# Patient Record
Sex: MALE | Race: BLACK OR AFRICAN AMERICAN | NOT HISPANIC OR LATINO | Employment: FULL TIME | URBAN - METROPOLITAN AREA
[De-identification: names, ages, dates, MRNs, and addresses within clinical notes are randomized per-mention and may not be internally consistent; named-entity substitution may affect disease eponyms.]

---

## 2017-01-30 RX ORDER — LISINOPRIL 40 MG/1
TABLET ORAL
Qty: 30 TABLET | Refills: 0 | Status: SHIPPED | OUTPATIENT
Start: 2017-01-30 | End: 2017-03-03 | Stop reason: SDUPTHER

## 2017-02-04 ENCOUNTER — OFFICE VISIT (OUTPATIENT)
Dept: FAMILY MEDICINE CLINIC | Facility: CLINIC | Age: 62
End: 2017-02-04

## 2017-02-04 VITALS
WEIGHT: 206 LBS | HEIGHT: 68 IN | OXYGEN SATURATION: 95 % | HEART RATE: 70 BPM | DIASTOLIC BLOOD PRESSURE: 88 MMHG | TEMPERATURE: 98.4 F | SYSTOLIC BLOOD PRESSURE: 153 MMHG | BODY MASS INDEX: 31.22 KG/M2

## 2017-02-04 DIAGNOSIS — IMO0002 TENDINITIS OF ELBOW OR FOREARM: ICD-10-CM

## 2017-02-04 DIAGNOSIS — L98.9 SKIN LESION: ICD-10-CM

## 2017-02-04 DIAGNOSIS — Z12.5 SPECIAL SCREENING EXAMINATION FOR NEOPLASM OF PROSTATE: ICD-10-CM

## 2017-02-04 DIAGNOSIS — I10 ESSENTIAL HYPERTENSION: Primary | ICD-10-CM

## 2017-02-04 LAB
ALBUMIN SERPL-MCNC: 4.3 G/DL (ref 3.5–5.2)
ALBUMIN/GLOB SERPL: 1.3 G/DL
ALP SERPL-CCNC: 66 U/L (ref 39–117)
ALT SERPL W P-5'-P-CCNC: 32 U/L (ref 1–41)
ANION GAP SERPL CALCULATED.3IONS-SCNC: 15.3 MMOL/L
AST SERPL-CCNC: 23 U/L (ref 1–40)
BILIRUB SERPL-MCNC: 0.8 MG/DL (ref 0.1–1.2)
BUN BLD-MCNC: 15 MG/DL (ref 6–20)
BUN/CREAT SERPL: 12.8 (ref 7–25)
CALCIUM SPEC-SCNC: 9.5 MG/DL (ref 8.6–10.5)
CHLORIDE SERPL-SCNC: 101 MMOL/L (ref 98–107)
CHOLEST SERPL-MCNC: 190 MG/DL (ref 0–200)
CO2 SERPL-SCNC: 25.7 MMOL/L (ref 22–29)
CREAT BLD-MCNC: 1.17 MG/DL (ref 0.76–1.27)
DEVELOPER EXPIRATION DATE: NORMAL
DEVELOPER LOT NUMBER: NORMAL
ERYTHROCYTE [DISTWIDTH] IN BLOOD BY AUTOMATED COUNT: 13.1 % (ref 4.5–15)
EXPIRATION DATE: NORMAL
FECAL OCCULT BLOOD SCREEN, POC: NEGATIVE
GFR SERPL CREATININE-BSD FRML MDRD: 77 ML/MIN/1.73
GLOBULIN UR ELPH-MCNC: 3.4 GM/DL
GLUCOSE BLD-MCNC: 106 MG/DL (ref 65–99)
HCT VFR BLD AUTO: 46.4 % (ref 35–60)
HDLC SERPL-MCNC: 66 MG/DL (ref 40–60)
HGB BLD-MCNC: 14.6 G/DL (ref 13.5–18)
LDLC SERPL CALC-MCNC: 104 MG/DL (ref 0–100)
LDLC/HDLC SERPL: 1.57 {RATIO}
LYMPHOCYTES # BLD AUTO: 2 10*3/MM3 (ref 1.2–3.4)
LYMPHOCYTES NFR BLD AUTO: 28.6 % (ref 21–51)
Lab: NORMAL
MCH RBC QN AUTO: 28.4 PG (ref 26.1–33.1)
MCHC RBC AUTO-ENTMCNC: 31.4 G/DL (ref 33–37)
MCV RBC AUTO: 90.5 FL (ref 80–99)
MONOCYTES # BLD AUTO: 0.5 10*3/MM3 (ref 0.1–0.6)
MONOCYTES NFR BLD AUTO: 6.6 % (ref 2–9)
NEGATIVE CONTROL: NEGATIVE
NEUTROPHILS # BLD AUTO: 4.6 10*3/MM3 (ref 1.4–6.5)
NEUTROPHILS NFR BLD AUTO: 64.8 % (ref 42–75)
PLATELET # BLD AUTO: 245 10*3/MM3 (ref 150–450)
PMV BLD AUTO: 6.8 FL (ref 7.1–10.5)
POSITIVE CONTROL: POSITIVE
POTASSIUM BLD-SCNC: 4.5 MMOL/L (ref 3.5–5.2)
PROT SERPL-MCNC: 7.7 G/DL (ref 6–8.5)
PSA SERPL-MCNC: 1.8 NG/ML (ref 0–4)
RBC # BLD AUTO: 5.13 10*6/MM3 (ref 4–6)
SODIUM BLD-SCNC: 142 MMOL/L (ref 136–145)
T-UPTAKE NFR SERPL: 1.1 TBI (ref 0.8–1.3)
T4 SERPL-MCNC: 6.57 MCG/DL (ref 4.5–11.7)
TRIGL SERPL-MCNC: 102 MG/DL (ref 0–150)
TSH SERPL DL<=0.05 MIU/L-ACNC: 0.62 MIU/ML (ref 0.27–4.2)
VLDLC SERPL-MCNC: 20.4 MG/DL (ref 5–40)
WBC NRBC COR # BLD: 7.1 10*3/MM3 (ref 4.5–10)

## 2017-02-04 PROCEDURE — 85025 COMPLETE CBC W/AUTO DIFF WBC: CPT | Performed by: FAMILY MEDICINE

## 2017-02-04 PROCEDURE — 84479 ASSAY OF THYROID (T3 OR T4): CPT | Performed by: FAMILY MEDICINE

## 2017-02-04 PROCEDURE — 80061 LIPID PANEL: CPT | Performed by: FAMILY MEDICINE

## 2017-02-04 PROCEDURE — 82270 OCCULT BLOOD FECES: CPT | Performed by: FAMILY MEDICINE

## 2017-02-04 PROCEDURE — 99214 OFFICE O/P EST MOD 30 MIN: CPT | Performed by: FAMILY MEDICINE

## 2017-02-04 PROCEDURE — 84443 ASSAY THYROID STIM HORMONE: CPT | Performed by: FAMILY MEDICINE

## 2017-02-04 PROCEDURE — 84153 ASSAY OF PSA TOTAL: CPT | Performed by: FAMILY MEDICINE

## 2017-02-04 PROCEDURE — 84436 ASSAY OF TOTAL THYROXINE: CPT | Performed by: FAMILY MEDICINE

## 2017-02-04 PROCEDURE — 80053 COMPREHEN METABOLIC PANEL: CPT | Performed by: FAMILY MEDICINE

## 2017-02-04 RX ORDER — MELOXICAM 15 MG/1
15 TABLET ORAL DAILY
Qty: 30 TABLET | Refills: 1 | Status: SHIPPED | OUTPATIENT
Start: 2017-02-04 | End: 2017-05-15 | Stop reason: SDUPTHER

## 2017-02-04 NOTE — PROGRESS NOTES
SUBJECTIVE:  The patient is a 61-year-old -American male who is here for several issues.  He has discomfort in his left forearm.  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  He has a skin lesion on his neck that he wants to BE addressed.  He is due a prostate exam.  He has a history of hypertension and ED     PAST MEDICAL HISTORY:  Reviewed.    REVIEW OF SYSTEMS:  Please see above; 14 point ROS otherwise negative.      OBJECTIVE: Vitals signs are reviewed and are stable.    HEENT: PERRLA.    Neck:  Supple.   Raised lesion approximately 5 mm is present on the right lateral neck.  Lungs:  Clear.    Heart:  Regular rate and rhythm.    Abdomen:   Soft, nontender.    Extremities:  No cyanosis, clubbing or edema.   This is present over the dorsal proximal mid and distal forearm.  Pain is present with supination and pronation.  Vascular status is intact  Rectal: Prostate 2+ nontender no masses Hemoccult negative    ASSESSMENT:     Hypertension  Tendinitis left forearm  ED  Skin lesion of neck  Prostate exam      PLAN:   Under sterile prep the lesion was removed with scalpel blade.  Samples of Viagra are given with instructions.  CMP PSA fasting lipid TSH thyroid profile are ordered.  He will monitor his blood pressure.  The patient will follow up on these labs.  He is prescribed Mobic 15 mg daily with food for his tendinitis.  He will up he know if no improvement.    Much of this encounter note is an electronic transcription/translation of spoken language to printed text.  The electronic translation of spoken language may permit erroneous, or at times, nonsensical words or phrases to be inadvertently transcribed.  Although I have reviewed the note for such errors, some may still exist.

## 2017-03-03 RX ORDER — LISINOPRIL 40 MG/1
TABLET ORAL
Qty: 30 TABLET | Refills: 0 | Status: SHIPPED | OUTPATIENT
Start: 2017-03-03 | End: 2017-04-01 | Stop reason: SDUPTHER

## 2017-04-03 RX ORDER — LISINOPRIL 40 MG/1
TABLET ORAL
Qty: 30 TABLET | Refills: 0 | Status: SHIPPED | OUTPATIENT
Start: 2017-04-03 | End: 2017-06-16 | Stop reason: SDUPTHER

## 2017-05-15 DIAGNOSIS — I10 ESSENTIAL HYPERTENSION: ICD-10-CM

## 2017-05-15 DIAGNOSIS — L98.9 SKIN LESION: ICD-10-CM

## 2017-05-15 DIAGNOSIS — IMO0002 TENDINITIS OF ELBOW OR FOREARM: ICD-10-CM

## 2017-05-15 DIAGNOSIS — Z12.5 SPECIAL SCREENING EXAMINATION FOR NEOPLASM OF PROSTATE: ICD-10-CM

## 2017-05-15 RX ORDER — MELOXICAM 15 MG/1
TABLET ORAL
Qty: 30 TABLET | Refills: 0 | Status: SHIPPED | OUTPATIENT
Start: 2017-05-15 | End: 2017-06-15 | Stop reason: SDUPTHER

## 2017-06-15 DIAGNOSIS — IMO0002 TENDINITIS OF ELBOW OR FOREARM: ICD-10-CM

## 2017-06-15 DIAGNOSIS — L98.9 SKIN LESION: ICD-10-CM

## 2017-06-15 DIAGNOSIS — I10 ESSENTIAL HYPERTENSION: ICD-10-CM

## 2017-06-15 DIAGNOSIS — Z12.5 SPECIAL SCREENING EXAMINATION FOR NEOPLASM OF PROSTATE: ICD-10-CM

## 2017-06-15 RX ORDER — MELOXICAM 15 MG/1
TABLET ORAL
Qty: 30 TABLET | Refills: 0 | Status: SHIPPED | OUTPATIENT
Start: 2017-06-15 | End: 2017-07-10

## 2017-06-17 RX ORDER — LISINOPRIL 40 MG/1
TABLET ORAL
Qty: 30 TABLET | Refills: 0 | Status: SHIPPED | OUTPATIENT
Start: 2017-06-17 | End: 2018-10-04

## 2017-07-10 ENCOUNTER — OFFICE VISIT (OUTPATIENT)
Dept: FAMILY MEDICINE CLINIC | Facility: CLINIC | Age: 62
End: 2017-07-10

## 2017-07-10 VITALS
SYSTOLIC BLOOD PRESSURE: 166 MMHG | HEIGHT: 68 IN | TEMPERATURE: 98.3 F | DIASTOLIC BLOOD PRESSURE: 92 MMHG | BODY MASS INDEX: 32.34 KG/M2 | WEIGHT: 213.4 LBS | OXYGEN SATURATION: 96 % | HEART RATE: 73 BPM

## 2017-07-10 DIAGNOSIS — R73.9 HYPERGLYCEMIA: ICD-10-CM

## 2017-07-10 DIAGNOSIS — I10 ESSENTIAL (PRIMARY) HYPERTENSION: Primary | ICD-10-CM

## 2017-07-10 DIAGNOSIS — R05.8 COUGH DUE TO ACE INHIBITOR: ICD-10-CM

## 2017-07-10 DIAGNOSIS — T46.4X5A COUGH DUE TO ACE INHIBITOR: ICD-10-CM

## 2017-07-10 DIAGNOSIS — N40.2 PROSTATE NODULE WITHOUT URINARY OBSTRUCTION: ICD-10-CM

## 2017-07-10 DIAGNOSIS — M54.2 NECK PAIN: ICD-10-CM

## 2017-07-10 DIAGNOSIS — R39.89 ABNORMAL PROSTATE EXAM: ICD-10-CM

## 2017-07-10 LAB
ALBUMIN SERPL-MCNC: 4.4 G/DL (ref 3.5–5.2)
ALBUMIN/GLOB SERPL: 1.4 G/DL
ALP SERPL-CCNC: 69 U/L (ref 39–117)
ALT SERPL W P-5'-P-CCNC: 27 U/L (ref 1–41)
ANION GAP SERPL CALCULATED.3IONS-SCNC: 14.7 MMOL/L
AST SERPL-CCNC: 22 U/L (ref 1–40)
BILIRUB SERPL-MCNC: 0.8 MG/DL (ref 0.1–1.2)
BUN BLD-MCNC: 10 MG/DL (ref 8–23)
BUN/CREAT SERPL: 8.6 (ref 7–25)
CALCIUM SPEC-SCNC: 8.8 MG/DL (ref 8.6–10.5)
CHLORIDE SERPL-SCNC: 100 MMOL/L (ref 98–107)
CHOLEST SERPL-MCNC: 172 MG/DL (ref 0–200)
CO2 SERPL-SCNC: 23.3 MMOL/L (ref 22–29)
CREAT BLD-MCNC: 1.16 MG/DL (ref 0.76–1.27)
GFR SERPL CREATININE-BSD FRML MDRD: 78 ML/MIN/1.73
GLOBULIN UR ELPH-MCNC: 3.2 GM/DL
GLUCOSE BLD-MCNC: 110 MG/DL (ref 65–99)
HBA1C MFR BLD: 5.6 % (ref 4.8–5.6)
HDLC SERPL-MCNC: 57 MG/DL (ref 40–60)
LDLC SERPL CALC-MCNC: 96 MG/DL (ref 0–100)
LDLC/HDLC SERPL: 1.69 {RATIO}
POTASSIUM BLD-SCNC: 4.2 MMOL/L (ref 3.5–5.2)
PROT SERPL-MCNC: 7.6 G/DL (ref 6–8.5)
PSA SERPL-MCNC: 2.06 NG/ML (ref 0–4)
SODIUM BLD-SCNC: 138 MMOL/L (ref 136–145)
TRIGL SERPL-MCNC: 94 MG/DL (ref 0–150)
VLDLC SERPL-MCNC: 18.8 MG/DL (ref 5–40)

## 2017-07-10 PROCEDURE — 83036 HEMOGLOBIN GLYCOSYLATED A1C: CPT | Performed by: FAMILY MEDICINE

## 2017-07-10 PROCEDURE — 80053 COMPREHEN METABOLIC PANEL: CPT | Performed by: FAMILY MEDICINE

## 2017-07-10 PROCEDURE — 99214 OFFICE O/P EST MOD 30 MIN: CPT | Performed by: FAMILY MEDICINE

## 2017-07-10 PROCEDURE — 84153 ASSAY OF PSA TOTAL: CPT | Performed by: FAMILY MEDICINE

## 2017-07-10 PROCEDURE — 80061 LIPID PANEL: CPT | Performed by: FAMILY MEDICINE

## 2017-07-10 RX ORDER — IBUPROFEN 800 MG/1
TABLET ORAL
Qty: 60 TABLET | Refills: 3 | Status: SHIPPED | OUTPATIENT
Start: 2017-07-10 | End: 2018-07-17 | Stop reason: SDUPTHER

## 2017-07-10 RX ORDER — LOSARTAN POTASSIUM 100 MG/1
100 TABLET ORAL DAILY
Qty: 90 TABLET | Refills: 3 | Status: SHIPPED | OUTPATIENT
Start: 2017-07-10 | End: 2018-07-21 | Stop reason: SDUPTHER

## 2017-07-10 NOTE — PROGRESS NOTES
SUBJECTIVE:  The patient is a 62-year-old -American male is here for several issues.  He is due labs.  He has a history of hypertension.  He has ongoing neck pain.  He feels that his cough that he is experiencing may be caused by lisinopril.  He wants to change.  He has a history of ED.  He has a history of hyperglycemia.     PAST MEDICAL HISTORY:  Reviewed.    REVIEW OF SYSTEMS:  Please see above; 14 point ROS otherwise negative.      OBJECTIVE: Vitals signs are reviewed and are stable.    HEENT: PERRLA.    Neck:  Supple.    Lungs:  Clear.    Heart:  Regular rate and rhythm.    Abdomen:   Soft, nontender.    Extremities:  No cyanosis, clubbing or edema.      ASSESSMENT:    Hypertension  Abnormal prostate exam  Neck pain  Hyperglycemia  Cough-probable Ace    PLAN:  Discontinue lisinopril.  Change to losartan 100 mg.  Take daily.  Monitor blood pressure.  Refill Flexeril.  Discontinue Lopid.  Change ibuprofen 800 mg every 8 hours with food.  Refer to urologist.  He is up-to-date on his colonoscopy.  EMP fasting lipids hemoglobin A1c are ordered.    Much of this encounter note is an electronic transcription/translation of spoken language to printed text.  The electronic translation of spoken language may permit erroneous, or at times, nonsensical words or phrases to be inadvertently transcribed.  Although I have reviewed the note for such errors, some may still exist.

## 2017-07-13 ENCOUNTER — OFFICE VISIT (OUTPATIENT)
Dept: FAMILY MEDICINE CLINIC | Facility: CLINIC | Age: 62
End: 2017-07-13

## 2017-07-13 ENCOUNTER — TELEPHONE (OUTPATIENT)
Dept: FAMILY MEDICINE CLINIC | Facility: CLINIC | Age: 62
End: 2017-07-13

## 2017-07-13 VITALS
BODY MASS INDEX: 32.52 KG/M2 | OXYGEN SATURATION: 95 % | HEIGHT: 68 IN | WEIGHT: 214.6 LBS | TEMPERATURE: 98.9 F | DIASTOLIC BLOOD PRESSURE: 98 MMHG | HEART RATE: 78 BPM | SYSTOLIC BLOOD PRESSURE: 160 MMHG

## 2017-07-13 DIAGNOSIS — J01.90 ACUTE SINUSITIS, RECURRENCE NOT SPECIFIED, UNSPECIFIED LOCATION: Primary | ICD-10-CM

## 2017-07-13 PROCEDURE — 99213 OFFICE O/P EST LOW 20 MIN: CPT | Performed by: FAMILY MEDICINE

## 2017-07-13 RX ORDER — LEVOFLOXACIN 500 MG/1
500 TABLET, FILM COATED ORAL DAILY
Qty: 10 TABLET | Refills: 0 | Status: SHIPPED | OUTPATIENT
Start: 2017-07-13 | End: 2018-10-04

## 2017-07-13 NOTE — PROGRESS NOTES
SUBJECTIVE:  The patient is a 62-year-old Afro-American male who comes in with three-day history of facial pressure congestion right ear discomfort and he took an Allegra without much improvement.  He is unaware of any fever or chills.  He's had a slight cough.     PAST MEDICAL HISTORY:  Reviewed.    REVIEW OF SYSTEMS:  Please see above; 14 point ROS otherwise negative.      OBJECTIVE: Vitals signs are reviewed and are stable.    HEENT: PERRLA.  Right tympanic membrane is red.  Left looks okay.  Throat red.  Neck:  Supple.    Lungs:  Clear.    Heart:  Regular rate and rhythm.    Abdomen:   Soft, nontender.    Extremities:  No cyanosis, clubbing or edema.      ASSESSMENT:    Acute sinusitis    PLAN:  Levaquin 500 milligrams daily.  Flonase as directed.  Follow-up in a couple days if no better.  Notify sooner if problems.    Much of this encounter note is an electronic transcription/translation of spoken language to printed text.  The electronic translation of spoken language may permit erroneous, or at times, nonsensical words or phrases to be inadvertently transcribed.  Although I have reviewed the note for such errors, some may still exist.

## 2017-07-14 DIAGNOSIS — IMO0002 TENDINITIS OF ELBOW OR FOREARM: ICD-10-CM

## 2017-07-14 DIAGNOSIS — I10 ESSENTIAL HYPERTENSION: ICD-10-CM

## 2017-07-14 DIAGNOSIS — L98.9 SKIN LESION: ICD-10-CM

## 2017-07-14 DIAGNOSIS — Z12.5 SPECIAL SCREENING EXAMINATION FOR NEOPLASM OF PROSTATE: ICD-10-CM

## 2017-07-14 RX ORDER — MELOXICAM 15 MG/1
TABLET ORAL
Qty: 30 TABLET | Refills: 0 | Status: SHIPPED | OUTPATIENT
Start: 2017-07-14 | End: 2018-10-04

## 2017-11-06 RX ORDER — FLUTICASONE PROPIONATE 50 MCG
SPRAY, SUSPENSION (ML) NASAL
Qty: 16 ML | Refills: 0 | Status: SHIPPED | OUTPATIENT
Start: 2017-11-06 | End: 2017-11-30 | Stop reason: SDUPTHER

## 2017-11-30 RX ORDER — FLUTICASONE PROPIONATE 50 MCG
SPRAY, SUSPENSION (ML) NASAL
Qty: 16 ML | Refills: 0 | Status: SHIPPED | OUTPATIENT
Start: 2017-11-30 | End: 2017-12-29 | Stop reason: SDUPTHER

## 2017-12-06 ENCOUNTER — TRANSCRIBE ORDERS (OUTPATIENT)
Dept: PHYSICAL THERAPY | Facility: CLINIC | Age: 62
End: 2017-12-06

## 2017-12-06 DIAGNOSIS — M25.511 RIGHT SHOULDER PAIN, UNSPECIFIED CHRONICITY: ICD-10-CM

## 2017-12-06 DIAGNOSIS — M54.2 PAIN, NECK: ICD-10-CM
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
## 2017-12-07 ENCOUNTER — TREATMENT (OUTPATIENT)
Dept: PHYSICAL THERAPY | Facility: CLINIC | Age: 62
End: 2017-12-07

## 2017-12-07 DIAGNOSIS — M54.2 PAIN, NECK: Primary | ICD-10-CM

## 2017-12-07 PROCEDURE — 97140 MANUAL THERAPY 1/> REGIONS: CPT | Performed by: PHYSICAL THERAPIST

## 2017-12-07 PROCEDURE — 97035 APP MDLTY 1+ULTRASOUND EA 15: CPT | Performed by: PHYSICAL THERAPIST

## 2017-12-07 PROCEDURE — 97001 PR PHYS THERAPY EVALUATION: CPT | Performed by: PHYSICAL THERAPIST

## 2017-12-07 PROCEDURE — 97110 THERAPEUTIC EXERCISES: CPT | Performed by: PHYSICAL THERAPIST
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
## 2017-12-13 ENCOUNTER — TREATMENT (OUTPATIENT)
Dept: PHYSICAL THERAPY | Facility: CLINIC | Age: 62
End: 2017-12-13

## 2017-12-13 DIAGNOSIS — M54.2 PAIN, NECK: Primary | ICD-10-CM

## 2017-12-13 PROCEDURE — 97140 MANUAL THERAPY 1/> REGIONS: CPT | Performed by: PHYSICAL THERAPIST

## 2017-12-13 PROCEDURE — 97035 APP MDLTY 1+ULTRASOUND EA 15: CPT | Performed by: PHYSICAL THERAPIST

## 2017-12-13 PROCEDURE — 97110 THERAPEUTIC EXERCISES: CPT | Performed by: PHYSICAL THERAPIST

## 2017-12-13 NOTE — PROGRESS NOTES
Physical Therapy Daily Progress Note            Subjective   Stiff but a little better    Objective   See Exercise, Manual, and Modality Logs for complete treatment.       Assessment/Plan    Responding favorably to Rx with some dec in pain but still with moderate mm guarding and tenderness    Progress as tanya           Manual Therapy:    16     mins  01546;  Therapeutic Exercise:    24     mins  45996;     Neuromuscular Je:    0    mins  40879;    Therapeutic Activity:     0     mins  01361;     Gait Trainin     mins  02903;     Ultrasound:     8     mins  92757;    Work Hardening           0      mins 09923  Iontophoresis               0   mins 24803    Timed Treatment:   48   mins   Total Treatment:     50   mins    Jacqui Hernandez PT  Physical Therapist

## 2017-12-14 ENCOUNTER — TREATMENT (OUTPATIENT)
Dept: PHYSICAL THERAPY | Facility: CLINIC | Age: 62
End: 2017-12-14

## 2017-12-14 DIAGNOSIS — M54.2 PAIN, NECK: Primary | ICD-10-CM

## 2017-12-14 PROCEDURE — 97035 APP MDLTY 1+ULTRASOUND EA 15: CPT | Performed by: PHYSICAL THERAPIST

## 2017-12-14 PROCEDURE — 97110 THERAPEUTIC EXERCISES: CPT | Performed by: PHYSICAL THERAPIST

## 2017-12-14 PROCEDURE — 97140 MANUAL THERAPY 1/> REGIONS: CPT | Performed by: PHYSICAL THERAPIST

## 2017-12-14 NOTE — PROGRESS NOTES
Physical Therapy Daily Progress Note            Subjective   Feeling better    Objective   See Exercise, Manual, and Modality Logs for complete treatment.       Assessment/Plan    Continues with moderate guarding and tenderness but responds well to manual Rx with dec pain at end of sessions    Assess for MD next session             Manual Therapy:    14     mins  89181;  Therapeutic Exercise:    26     mins  26386;     Neuromuscular Je:    0    mins  12229;    Therapeutic Activity:     0     mins  98507;     Gait Trainin     mins  98553;     Ultrasound:     8     mins  96553;    Work Hardening           0      mins 29745  Iontophoresis               0   mins 10219    Timed Treatment:   48   mins   Total Treatment:     50   mins    Jacqui Hernandez PT  Physical Therapist

## 2017-12-18 ENCOUNTER — TREATMENT (OUTPATIENT)
Dept: PHYSICAL THERAPY | Facility: CLINIC | Age: 62
End: 2017-12-18

## 2017-12-18 DIAGNOSIS — M54.2 PAIN, NECK: Primary | ICD-10-CM

## 2017-12-18 PROCEDURE — 97530 THERAPEUTIC ACTIVITIES: CPT | Performed by: PHYSICAL THERAPIST

## 2017-12-18 PROCEDURE — 97140 MANUAL THERAPY 1/> REGIONS: CPT | Performed by: PHYSICAL THERAPIST

## 2017-12-18 PROCEDURE — 97110 THERAPEUTIC EXERCISES: CPT | Performed by: PHYSICAL THERAPIST

## 2017-12-18 NOTE — PROGRESS NOTES
Physical Therapy  Progress Note    Time In 10:34  Time Out 11:37      12/18/2017  DANNI Regalado    Re: Stewart Hyde  ________________________________________________________________    Mr. Stewart Hyde, has attended 4/4 PT sessions.  Treatment has consisted of: manual, there-ex, HEP, modalities, pt ed     S: Mr. Stewart Hyde states: he's feeling a lot better          Subjective     Objective       Postural Observations    Additional Postural Observation Details  Forward head posture with right shoulder rounded and internally rotated    Palpation   Left   Tenderness of the suboccipitals.     Right Tenderness of the levator scapulae, middle trapezius, scalenes, sternocleidomastoid, suboccipitals and upper trapezius.     Additional Palpation Details  All better though    Tenderness   Cervical Spine   Tenderness in the right scapula.     Active Range of Motion     Additional Active Range of Motion Details  All WFL withsome mild pain on R side with LROT and SB    Strength/Myotome Testing   Cervical Spine   Neck extension: 4+  Neck flexion: 4    Left   Neck lateral flexion (C3): 4+    Right   Neck lateral flexion (C3): 4    Left Shoulder     Planes of Motion   Flexion: 5   Abduction: 5     Right Shoulder     Planes of Motion   Flexion: 4+ (pain)   Abduction: 4+ (pain)     Tests   Cervical     Left   Negative Spurling's sign.     Right   Negative Spurling's sign.     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       Assessment/Plan    Good response to PT with dec c/o pain and improved mobility and tenderness.  Still with tenderness, pain with some movements and limited tanya for lifting.  Could benefit from another week of  PT.    Please advise             Manual Therapy:    14     mins  80663;  Therapeutic Exercise:    29    mins  10628;     Neuromuscular Je:    0    mins  71863;    Therapeutic Activity:      8    mins  51108;     Gait Trainin     mins  26403;     Ultrasound:     8     mins  80299;    Work Hardening           0      mins 55676  Iontophoresis               0   mins 71755    Timed Treatment:   60  mins   Total Treatment:     63   mins    Jacqui Hernandez, PT  Physical Therapist

## 2017-12-29 RX ORDER — FLUTICASONE PROPIONATE 50 MCG
SPRAY, SUSPENSION (ML) NASAL
Qty: 16 ML | Refills: 0 | Status: SHIPPED | OUTPATIENT
Start: 2017-12-29 | End: 2018-01-27 | Stop reason: SDUPTHER

## 2018-01-29 RX ORDER — FLUTICASONE PROPIONATE 50 MCG
SPRAY, SUSPENSION (ML) NASAL
Qty: 16 ML | Refills: 0 | Status: SHIPPED | OUTPATIENT
Start: 2018-01-29

## 2018-07-17 RX ORDER — IBUPROFEN 800 MG/1
TABLET ORAL
Qty: 60 TABLET | Refills: 0 | Status: SHIPPED | OUTPATIENT
Start: 2018-07-17 | End: 2018-10-04 | Stop reason: SDUPTHER

## 2018-07-22 RX ORDER — LOSARTAN POTASSIUM 100 MG/1
100 TABLET ORAL DAILY
Qty: 90 TABLET | Refills: 0 | Status: SHIPPED | OUTPATIENT
Start: 2018-07-22 | End: 2018-10-11

## 2018-10-04 ENCOUNTER — OFFICE VISIT (OUTPATIENT)
Dept: FAMILY MEDICINE CLINIC | Facility: CLINIC | Age: 63
End: 2018-10-04

## 2018-10-04 VITALS
BODY MASS INDEX: 31.07 KG/M2 | HEIGHT: 68 IN | DIASTOLIC BLOOD PRESSURE: 90 MMHG | OXYGEN SATURATION: 95 % | TEMPERATURE: 98.8 F | SYSTOLIC BLOOD PRESSURE: 160 MMHG | HEART RATE: 73 BPM | WEIGHT: 205 LBS

## 2018-10-04 DIAGNOSIS — I10 ESSENTIAL (PRIMARY) HYPERTENSION: ICD-10-CM

## 2018-10-04 DIAGNOSIS — M79.601 RIGHT ARM PAIN: ICD-10-CM

## 2018-10-04 DIAGNOSIS — S16.1XXA STRAIN OF NECK MUSCLE, INITIAL ENCOUNTER: Primary | ICD-10-CM

## 2018-10-04 PROCEDURE — 99213 OFFICE O/P EST LOW 20 MIN: CPT | Performed by: NURSE PRACTITIONER

## 2018-10-04 RX ORDER — CYCLOBENZAPRINE HCL 10 MG
10 TABLET ORAL 3 TIMES DAILY PRN
Qty: 30 TABLET | Refills: 0 | Status: SHIPPED | OUTPATIENT
Start: 2018-10-04

## 2018-10-04 RX ORDER — IBUPROFEN 800 MG/1
800 TABLET ORAL EVERY 8 HOURS PRN
Qty: 60 TABLET | Refills: 0 | Status: SHIPPED | OUTPATIENT
Start: 2018-10-04

## 2018-10-04 NOTE — PATIENT INSTRUCTIONS
Flexeril 10mg every 8 hours as needed for spasm, educated about sedation.   Ibuprofen 800m,g every 8 hours as needed for pain,   Apply heat and ice alternating on 20 min off 1 hour   Deferred xrays today.   If symptoms persist call office.cont f/u as scheduled next week.   Increase fluid intake, get plenty of rest.   Patient agrees with plan of care and understands instructions. Call if worsening symptoms or any problems or concerns.
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
## 2018-10-09 NOTE — PROGRESS NOTES
SUBJECTIVE:   Stewart Hyde is a 63 y.o., male, who presents for a complete physical examination.    Occupation:  Reviewed  Family History:  Reviewed  Past Medical History: Reviewed  Surgical History:  Reviewed  Social History:  Reviewed    Review of Systems:  Constitutional:  Negative for chills, fever, dizziness, weight loss, diaphoresis,   Eyes:  Negative for blurred vision, redness, discharge, diplopia, photophobia or itching.  ENT:  Negative for earache, ST, toothache, rhinorrhea, hoarseness, or PND.  Musculoskeletal:  Negative for neck pain.  No heat, myalgia, redness or joint pain.  Cardiovascular:  He will become short of breath going up a flight of stairs.  chest pain, LE edema, palpitations, or rapid heart rate.    Skin:  Negative for bruising, swelling, rash, abrasions, or itching.    Respiratory:  Negatie for pleuritic chest pain, shortness of air, nonproductive/productive cough, hemoptysis, or GUERRERO.    Neurologic:  Negative for history of weakness, numbness, paresthesia, loss of consciousness, speech change, or ataxia.    Gastrointestinal:  Negative for melena, hematochezia, nausea, vomiting, change in bowel habits.    Genitourinary:  Negative for flank pain, dysuria, frequency, or hematuria.    Psychiatric:  Negative for agitation, suicidal ideation, change in mental status, depression, confusion, or insomnia.  Hematologic:  Negative for nodes, bruising, bleeding, or petechiae.  Immunologic:  Negative for atopic dermatitis, sneezing, rhinorrhea, or hives.  Integument: No suspicious lesions. Skin turgor good.    ALLERGIES:  Penicillin    OBJECTIVE:    Vital Signs:  Reviewed.  Initial blood pressure 140/106  Constitutional:  Alert and oriented x3, in no acute distress.  Eyes:  Sclerae white, conjunctivae clear.  Lids are without lag.  PERRLA. Discs sharp.    Ears:  No scars, lesions or masses.  Tympanic membranes translucent, nonbulging, and mobile. Canal walls are pink.  Septum is midline.    Mouth:   Lips are pink and symmetrical.  Gums are pink.  Good dentition.    Throat:  Oral mucosa pink and moist.  Salivery glands intact.  Soft and hard palates contiguous.  Tongue moist without ulcers.  Gag reflex present.    Neck:  Full range of motion.  Trachea midline position.  No thyromegaly.   Respiratory:  Respirations are even and unlabored.  Lung fields with no flatness, dullness, or hyperresonance.  Clear and equal breath sounds with no adventitious sounds bilaterally.    Cardiovascular:  No lifts, heaves, or thrills.  PMI present.  S1 and S2 not exaggerated or diminished.  Regular rate and rhythm, without murmurs, rubs or gallops.  Normal carotids.  Pedal pulses are within normal limits bilaterally.  No edema.  No varicosities.    Chest:  Equal bilateral expansion.  Abdomen:  No masses or tenderness.  Bowel sounds active x4 quadrants.  Liver and spleen are without tenderness or enlargement.  No hernias. No organomegaly.   Digital Rectal Exam: Patient declines.  Has appointment with urologist for prostate exam next week.  Genitalia:  Normal circumcised male.  Cystic lesion present on the right shaft of penis.  Lymphatic:  Areas palpated are not enlarged.    Extremities:  Full range of motion. No cyanosis, clubbing, or edema.  Musculoskeletal:  Gait coordinated and smooth.    Skin:  No rashes, lesions, or ulcers.  No discoloration.  Warm and dry.  Normal turgor.    Neurologic:  Cranial nerves are intact.  Deep tendon reflexes are 2+ bilaterally.  Superficial touch and pain sensation intact bilaterally.  Psychiatric:  Judgement and insight are normal.  Orientation to time, place and person.  Normal mood and affect.  Memory intact.      EKG is done here and interpreted by me.  EKG compared December 2013.  EKG shows sinus rhythm with findings consistent with left ventricular hypertrophy.  An inverted T-wave is present and V6 which was not present in 2013    Chest x-rays done here and interpreted by me.  Indication  complete physical.  None for comparison.  Chest x-ray shows no acute abnormalities.    ECG 12 Lead  Date/Time: 10/11/2018 9:51 AM  Performed by: VIOLETTE LEDESMA  Authorized by: VIOLETTE LEDESMA   Rhythm: sinus rhythm  Clinical impression: non-specific ECG            LABORATORY:  CBC CMP fasting lipids TSH thyroid profile urinalysis    ASSESSMENT:  Complete physical examination.      PL.AN:  Healthy lifestyle discussed.  Patient is going to call his gastroenterologist in arrange his colonoscopy which she thinks is new.  Patient's medication for blood pressure will be changed to Avapro 150 mg daily will monitor his blood pressure.  He is referred for a stress echo.

## 2018-10-11 ENCOUNTER — OFFICE VISIT (OUTPATIENT)
Dept: FAMILY MEDICINE CLINIC | Facility: CLINIC | Age: 63
End: 2018-10-11

## 2018-10-11 VITALS
TEMPERATURE: 98.3 F | OXYGEN SATURATION: 97 % | HEART RATE: 69 BPM | SYSTOLIC BLOOD PRESSURE: 140 MMHG | BODY MASS INDEX: 31.22 KG/M2 | HEIGHT: 68 IN | WEIGHT: 206 LBS | DIASTOLIC BLOOD PRESSURE: 106 MMHG

## 2018-10-11 DIAGNOSIS — Z00.00 ENCOUNTER FOR PREVENTATIVE ADULT HEALTH CARE EXAMINATION: Primary | ICD-10-CM

## 2018-10-11 DIAGNOSIS — R06.09 EXERTIONAL DYSPNEA: ICD-10-CM

## 2018-10-11 LAB
25(OH)D3 SERPL-MCNC: 31.8 NG/ML (ref 30–100)
ALBUMIN SERPL-MCNC: 4.6 G/DL (ref 3.5–5.2)
ALBUMIN/GLOB SERPL: 1.4 G/DL
ALP SERPL-CCNC: 67 U/L (ref 39–117)
ALT SERPL W P-5'-P-CCNC: 22 U/L (ref 1–41)
ANION GAP SERPL CALCULATED.3IONS-SCNC: 10.8 MMOL/L
AST SERPL-CCNC: 22 U/L (ref 1–40)
BACTERIA UR QL AUTO: NORMAL /HPF
BILIRUB SERPL-MCNC: 0.8 MG/DL (ref 0.1–1.2)
BILIRUB UR QL STRIP: NEGATIVE
BUN BLD-MCNC: 18 MG/DL (ref 8–23)
BUN/CREAT SERPL: 13.8 (ref 7–25)
CALCIUM SPEC-SCNC: 9.5 MG/DL (ref 8.6–10.5)
CHLORIDE SERPL-SCNC: 103 MMOL/L (ref 98–107)
CHOLEST SERPL-MCNC: 159 MG/DL (ref 0–200)
CLARITY UR: CLEAR
CO2 SERPL-SCNC: 27.2 MMOL/L (ref 22–29)
COLOR UR: YELLOW
CREAT BLD-MCNC: 1.3 MG/DL (ref 0.76–1.27)
ERYTHROCYTE [DISTWIDTH] IN BLOOD BY AUTOMATED COUNT: 13 % (ref 4.5–15)
GFR SERPL CREATININE-BSD FRML MDRD: 68 ML/MIN/1.73
GLOBULIN UR ELPH-MCNC: 3.3 GM/DL
GLUCOSE BLD-MCNC: 101 MG/DL (ref 65–99)
GLUCOSE UR STRIP-MCNC: NEGATIVE MG/DL
HCT VFR BLD AUTO: 45.6 % (ref 35–60)
HDLC SERPL-MCNC: 62 MG/DL (ref 40–60)
HGB BLD-MCNC: 14.9 G/DL (ref 13.5–18)
HGB UR QL STRIP.AUTO: NEGATIVE
KETONES UR QL STRIP: NEGATIVE
LDLC SERPL CALC-MCNC: 82 MG/DL (ref 0–100)
LDLC/HDLC SERPL: 1.32 {RATIO}
LEUKOCYTE ESTERASE UR QL STRIP.AUTO: NEGATIVE
LYMPHOCYTES # BLD AUTO: 2 10*3/MM3 (ref 1.2–3.4)
LYMPHOCYTES NFR BLD AUTO: 30.5 % (ref 21–51)
MCH RBC QN AUTO: 29.7 PG (ref 26.1–33.1)
MCHC RBC AUTO-ENTMCNC: 32.7 G/DL (ref 33–37)
MCV RBC AUTO: 90.9 FL (ref 80–99)
MONOCYTES # BLD AUTO: 0.6 10*3/MM3 (ref 0.1–0.6)
MONOCYTES NFR BLD AUTO: 8.6 % (ref 2–9)
NEUTROPHILS # BLD AUTO: 3.9 10*3/MM3 (ref 1.4–6.5)
NEUTROPHILS NFR BLD AUTO: 60.9 % (ref 42–75)
NITRITE UR QL STRIP: NEGATIVE
PH UR STRIP.AUTO: 6 [PH] (ref 4.6–8)
PLATELET # BLD AUTO: 249 10*3/MM3 (ref 150–450)
PMV BLD AUTO: 7.4 FL (ref 7.1–10.5)
POTASSIUM BLD-SCNC: 4.3 MMOL/L (ref 3.5–5.2)
PROT SERPL-MCNC: 7.9 G/DL (ref 6–8.5)
PROT UR QL STRIP: NEGATIVE
RBC # BLD AUTO: 5.02 10*6/MM3 (ref 4–6)
RBC # UR: NORMAL /HPF
REF LAB TEST METHOD: NORMAL
SODIUM BLD-SCNC: 141 MMOL/L (ref 136–145)
SP GR UR STRIP: 1.01 (ref 1–1.03)
SQUAMOUS #/AREA URNS HPF: NORMAL /HPF
T-UPTAKE NFR SERPL: 1.1 TBI (ref 0.8–1.3)
T4 SERPL-MCNC: 7.33 MCG/DL (ref 4.5–11.7)
TRIGL SERPL-MCNC: 75 MG/DL (ref 0–150)
TSH SERPL DL<=0.05 MIU/L-ACNC: 0.8 MIU/ML (ref 0.27–4.2)
UROBILINOGEN UR QL STRIP: NORMAL
VLDLC SERPL-MCNC: 15 MG/DL (ref 5–40)
WBC NRBC COR # BLD: 6.4 10*3/MM3 (ref 4.5–10)
WBC UR QL AUTO: NORMAL /HPF

## 2018-10-11 PROCEDURE — 84479 ASSAY OF THYROID (T3 OR T4): CPT | Performed by: FAMILY MEDICINE

## 2018-10-11 PROCEDURE — 82306 VITAMIN D 25 HYDROXY: CPT | Performed by: FAMILY MEDICINE

## 2018-10-11 PROCEDURE — 84436 ASSAY OF TOTAL THYROXINE: CPT | Performed by: FAMILY MEDICINE

## 2018-10-11 PROCEDURE — 84443 ASSAY THYROID STIM HORMONE: CPT | Performed by: FAMILY MEDICINE

## 2018-10-11 PROCEDURE — 85025 COMPLETE CBC W/AUTO DIFF WBC: CPT | Performed by: FAMILY MEDICINE

## 2018-10-11 PROCEDURE — 71046 X-RAY EXAM CHEST 2 VIEWS: CPT | Performed by: FAMILY MEDICINE

## 2018-10-11 PROCEDURE — 81001 URINALYSIS AUTO W/SCOPE: CPT | Performed by: FAMILY MEDICINE

## 2018-10-11 PROCEDURE — 80061 LIPID PANEL: CPT | Performed by: FAMILY MEDICINE

## 2018-10-11 PROCEDURE — 99396 PREV VISIT EST AGE 40-64: CPT | Performed by: FAMILY MEDICINE

## 2018-10-11 PROCEDURE — 80053 COMPREHEN METABOLIC PANEL: CPT | Performed by: FAMILY MEDICINE

## 2018-10-11 PROCEDURE — 93000 ELECTROCARDIOGRAM COMPLETE: CPT | Performed by: FAMILY MEDICINE

## 2018-10-11 RX ORDER — CEFDINIR 300 MG/1
CAPSULE ORAL
Refills: 0 | COMMUNITY
Start: 2018-10-05

## 2018-10-11 RX ORDER — IRBESARTAN 150 MG/1
150 TABLET ORAL NIGHTLY
Qty: 90 TABLET | Refills: 3 | Status: SHIPPED | OUTPATIENT
Start: 2018-10-11 | End: 2019-10-28 | Stop reason: SDUPTHER

## 2018-10-26 ENCOUNTER — APPOINTMENT (OUTPATIENT)
Dept: CARDIOLOGY | Facility: HOSPITAL | Age: 63
End: 2018-10-26

## 2018-10-26 ENCOUNTER — HOSPITAL ENCOUNTER (OUTPATIENT)
Dept: CARDIOLOGY | Facility: HOSPITAL | Age: 63
Discharge: HOME | End: 2018-10-26
Admitting: FAMILY MEDICINE

## 2018-10-26 DIAGNOSIS — R06.09 EXERTIONAL DYSPNEA: ICD-10-CM

## 2018-10-26 LAB
BH CV ECHO MEAS - ACS: 2.2 CM
BH CV ECHO MEAS - AO MAX PG (FULL): 3.4 MMHG
BH CV ECHO MEAS - AO MAX PG: 6.8 MMHG
BH CV ECHO MEAS - AO MEAN PG (FULL): 2 MMHG
BH CV ECHO MEAS - AO MEAN PG: 4 MMHG
BH CV ECHO MEAS - AO ROOT AREA (BSA CORRECTED): 1.5
BH CV ECHO MEAS - AO ROOT AREA: 8 CM^2
BH CV ECHO MEAS - AO ROOT DIAM: 3.2 CM
BH CV ECHO MEAS - AO V2 MAX: 130 CM/SEC
BH CV ECHO MEAS - AO V2 MEAN: 83.8 CM/SEC
BH CV ECHO MEAS - AO V2 VTI: 28.8 CM
BH CV ECHO MEAS - AVA(I,A): 2 CM^2
BH CV ECHO MEAS - AVA(I,D): 2 CM^2
BH CV ECHO MEAS - AVA(V,A): 2.2 CM^2
BH CV ECHO MEAS - AVA(V,D): 2.2 CM^2
BH CV ECHO MEAS - BSA(HAYCOCK): 2.1 M^2
BH CV ECHO MEAS - BSA: 2.1 M^2
BH CV ECHO MEAS - BZI_BMI: 31.3 KILOGRAMS/M^2
BH CV ECHO MEAS - BZI_METRIC_HEIGHT: 172.7 CM
BH CV ECHO MEAS - BZI_METRIC_WEIGHT: 93.4 KG
BH CV ECHO MEAS - EDV(CUBED): 54.9 ML
BH CV ECHO MEAS - EDV(MOD-SP2): 85 ML
BH CV ECHO MEAS - EDV(MOD-SP4): 85 ML
BH CV ECHO MEAS - EDV(TEICH): 62 ML
BH CV ECHO MEAS - EF(CUBED): 71.5 %
BH CV ECHO MEAS - EF(MOD-BP): 56 %
BH CV ECHO MEAS - EF(MOD-SP2): 57.6 %
BH CV ECHO MEAS - EF(MOD-SP4): 56.5 %
BH CV ECHO MEAS - EF(TEICH): 64 %
BH CV ECHO MEAS - ESV(CUBED): 15.6 ML
BH CV ECHO MEAS - ESV(MOD-SP2): 36 ML
BH CV ECHO MEAS - ESV(MOD-SP4): 37 ML
BH CV ECHO MEAS - ESV(TEICH): 22.3 ML
BH CV ECHO MEAS - FS: 34.2 %
BH CV ECHO MEAS - IVS/LVPW: 0.93
BH CV ECHO MEAS - IVSD: 1.4 CM
BH CV ECHO MEAS - LAT PEAK E' VEL: 8 CM/SEC
BH CV ECHO MEAS - LV DIASTOLIC VOL/BSA (35-75): 41.1 ML/M^2
BH CV ECHO MEAS - LV MASS(C)D: 205.2 GRAMS
BH CV ECHO MEAS - LV MASS(C)DI: 99.1 GRAMS/M^2
BH CV ECHO MEAS - LV MAX PG: 3.4 MMHG
BH CV ECHO MEAS - LV MEAN PG: 2 MMHG
BH CV ECHO MEAS - LV SYSTOLIC VOL/BSA (12-30): 17.9 ML/M^2
BH CV ECHO MEAS - LV V1 MAX: 91.7 CM/SEC
BH CV ECHO MEAS - LV V1 MEAN: 62.7 CM/SEC
BH CV ECHO MEAS - LV V1 VTI: 18.4 CM
BH CV ECHO MEAS - LVIDD: 3.8 CM
BH CV ECHO MEAS - LVIDS: 2.5 CM
BH CV ECHO MEAS - LVLD AP2: 8.1 CM
BH CV ECHO MEAS - LVLD AP4: 8.3 CM
BH CV ECHO MEAS - LVLS AP2: 6.7 CM
BH CV ECHO MEAS - LVLS AP4: 7.1 CM
BH CV ECHO MEAS - LVOT AREA (M): 3.1 CM^2
BH CV ECHO MEAS - LVOT AREA: 3.1 CM^2
BH CV ECHO MEAS - LVOT DIAM: 2 CM
BH CV ECHO MEAS - LVPWD: 1.5 CM
BH CV ECHO MEAS - MED PEAK E' VEL: 3 CM/SEC
BH CV ECHO MEAS - MV A DUR: 0.11 SEC
BH CV ECHO MEAS - MV A MAX VEL: 108 CM/SEC
BH CV ECHO MEAS - MV DEC SLOPE: 332 CM/SEC^2
BH CV ECHO MEAS - MV DEC TIME: 0.2 SEC
BH CV ECHO MEAS - MV E MAX VEL: 65.5 CM/SEC
BH CV ECHO MEAS - MV E/A: 0.61
BH CV ECHO MEAS - MV MAX PG: 6 MMHG
BH CV ECHO MEAS - MV MEAN PG: 1 MMHG
BH CV ECHO MEAS - MV P1/2T MAX VEL: 73.3 CM/SEC
BH CV ECHO MEAS - MV P1/2T: 64.7 MSEC
BH CV ECHO MEAS - MV V2 MAX: 122 CM/SEC
BH CV ECHO MEAS - MV V2 MEAN: 45.6 CM/SEC
BH CV ECHO MEAS - MV V2 VTI: 25.6 CM
BH CV ECHO MEAS - MVA P1/2T LCG: 3 CM^2
BH CV ECHO MEAS - MVA(P1/2T): 3.4 CM^2
BH CV ECHO MEAS - MVA(VTI): 2.3 CM^2
BH CV ECHO MEAS - PA ACC TIME: 0.12 SEC
BH CV ECHO MEAS - PA MAX PG (FULL): 0.55 MMHG
BH CV ECHO MEAS - PA MAX PG: 3.1 MMHG
BH CV ECHO MEAS - PA PR(ACCEL): 26.8 MMHG
BH CV ECHO MEAS - PA V2 MAX: 88.6 CM/SEC
BH CV ECHO MEAS - PULM A REVS DUR: 0.12 SEC
BH CV ECHO MEAS - PULM A REVS VEL: 22.9 CM/SEC
BH CV ECHO MEAS - PULM DIAS VEL: 32.8 CM/SEC
BH CV ECHO MEAS - PULM S/D: 1.3
BH CV ECHO MEAS - PULM SYS VEL: 42.4 CM/SEC
BH CV ECHO MEAS - PVA(V,A): 5.2 CM^2
BH CV ECHO MEAS - PVA(V,D): 5.2 CM^2
BH CV ECHO MEAS - QP/QS: 1.9
BH CV ECHO MEAS - RAP SYSTOLE: 8 MMHG
BH CV ECHO MEAS - RV MAX PG: 2.6 MMHG
BH CV ECHO MEAS - RV MEAN PG: 1 MMHG
BH CV ECHO MEAS - RV V1 MAX: 80.5 CM/SEC
BH CV ECHO MEAS - RV V1 MEAN: 44.9 CM/SEC
BH CV ECHO MEAS - RV V1 VTI: 18.8 CM
BH CV ECHO MEAS - RVOT AREA: 5.7 CM^2
BH CV ECHO MEAS - RVOT DIAM: 2.7 CM
BH CV ECHO MEAS - RVSP: 27.5 MMHG
BH CV ECHO MEAS - SI(AO): 111.9 ML/M^2
BH CV ECHO MEAS - SI(CUBED): 19 ML/M^2
BH CV ECHO MEAS - SI(LVOT): 27.9 ML/M^2
BH CV ECHO MEAS - SI(MOD-SP2): 23.7 ML/M^2
BH CV ECHO MEAS - SI(MOD-SP4): 23.2 ML/M^2
BH CV ECHO MEAS - SI(TEICH): 19.1 ML/M^2
BH CV ECHO MEAS - SUP REN AO DIAM: 2 CM
BH CV ECHO MEAS - SV(AO): 231.6 ML
BH CV ECHO MEAS - SV(CUBED): 39.2 ML
BH CV ECHO MEAS - SV(LVOT): 57.8 ML
BH CV ECHO MEAS - SV(MOD-SP2): 49 ML
BH CV ECHO MEAS - SV(MOD-SP4): 48 ML
BH CV ECHO MEAS - SV(RVOT): 107.6 ML
BH CV ECHO MEAS - SV(TEICH): 39.6 ML
BH CV ECHO MEAS - TAPSE (>1.6): 1.3 CM2
BH CV ECHO MEAS - TR MAX VEL: 221 CM/SEC
BH CV ECHO MEASUREMENTS AVERAGE E/E' RATIO: 11.91
BH CV STRESS DURATION MIN STAGE 1: 3
BH CV STRESS DURATION SEC STAGE 1: 0
BH CV STRESS ECHO POST STRESS EJECTION FRACTION EF: 62 %
BH CV STRESS GRADE STAGE 1: 10
BH CV STRESS METS STAGE 1: 5
BH CV STRESS PROTOCOL 1: NORMAL
BH CV STRESS RECOVERY BP: NORMAL MMHG
BH CV STRESS RECOVERY HR: 77 BPM
BH CV STRESS SPEED STAGE 1: 1.7
BH CV STRESS STAGE 1: 1
BH CV VAS BP RIGHT ARM: NORMAL MMHG
BH CV XLRA - RV BASE: 2.7 CM
BH CV XLRA - TDI S': 12 CM/SEC
LEFT ATRIUM VOLUME INDEX: 21 ML/M2
MAXIMAL PREDICTED HEART RATE: 159 BPM
PERCENT MAX PREDICTED HR: 83.65 %
STRESS BASELINE BP: NORMAL MMHG
STRESS BASELINE HR: 69 BPM
STRESS PERCENT HR: 98 %
STRESS POST ESTIMATED WORKLOAD: 7.1 METS
STRESS POST EXERCISE DUR MIN: 4 MIN
STRESS POST EXERCISE DUR SEC: 5 SEC
STRESS POST PEAK BP: NORMAL MMHG
STRESS POST PEAK HR: 133 BPM
STRESS TARGET HR: 135 BPM

## 2018-10-26 PROCEDURE — 93016 CV STRESS TEST SUPVJ ONLY: CPT | Performed by: INTERNAL MEDICINE

## 2018-10-26 PROCEDURE — 93325 DOPPLER ECHO COLOR FLOW MAPG: CPT | Performed by: INTERNAL MEDICINE

## 2018-10-26 PROCEDURE — 93320 DOPPLER ECHO COMPLETE: CPT | Performed by: INTERNAL MEDICINE

## 2018-10-26 PROCEDURE — 93017 CV STRESS TEST TRACING ONLY: CPT

## 2018-10-26 PROCEDURE — 93350 STRESS TTE ONLY: CPT | Performed by: INTERNAL MEDICINE

## 2018-10-26 PROCEDURE — 93018 CV STRESS TEST I&R ONLY: CPT | Performed by: INTERNAL MEDICINE

## 2018-10-26 PROCEDURE — 93350 STRESS TTE ONLY: CPT

## 2018-10-26 PROCEDURE — 93325 DOPPLER ECHO COLOR FLOW MAPG: CPT

## 2018-10-26 PROCEDURE — 93320 DOPPLER ECHO COMPLETE: CPT

## 2019-01-23 ENCOUNTER — TELEPHONE (OUTPATIENT)
Dept: GASTROENTEROLOGY | Facility: CLINIC | Age: 64
End: 2019-01-23

## 2019-01-23 NOTE — TELEPHONE ENCOUNTER
----- Message from Marika Perez sent at 1/22/2019  4:02 PM EST -----  Regarding: colonoscopy recall  Contact: [phone]  Pt is due in May for 5 yr recall. He is going to retire next month and wants to know if he can have done early. No blood thinners. Please call patient    Patient is going to call Insurance. To make sure it is ok for him to do colonoscopy. Sooner than 5 years and a day. Which would be after 5-5-19

## 2019-04-26 ENCOUNTER — TELEPHONE (OUTPATIENT)
Dept: GASTROENTEROLOGY | Facility: CLINIC | Age: 64
End: 2019-04-26

## 2019-04-26 NOTE — TELEPHONE ENCOUNTER
Returned patients call. Did [employer] history. He will stop in today to do paper work. Colonoscopy to be done 5-30-19. He will arrive at 9 am.

## 2019-04-29 ENCOUNTER — PREP FOR SURGERY (OUTPATIENT)
Dept: OTHER | Facility: HOSPITAL | Age: 64
End: 2019-04-29

## 2019-04-29 DIAGNOSIS — Z12.11 ENCOUNTER FOR SCREENING FOR MALIGNANT NEOPLASM OF COLON: Primary | ICD-10-CM

## 2019-04-29 RX ORDER — SODIUM CHLORIDE, SODIUM LACTATE, POTASSIUM CHLORIDE, CALCIUM CHLORIDE 600; 310; 30; 20 MG/100ML; MG/100ML; MG/100ML; MG/100ML
30 INJECTION, SOLUTION INTRAVENOUS CONTINUOUS
Status: CANCELLED | OUTPATIENT
Start: 2019-05-30

## 2019-04-30 PROBLEM — Z12.11 ENCOUNTER FOR SCREENING FOR MALIGNANT NEOPLASM OF COLON: Status: ACTIVE | Noted: 2019-04-30

## 2019-05-02 ENCOUNTER — HOSPITAL ENCOUNTER (EMERGENCY)
Facility: HOSPITAL | Age: 64
Discharge: HOME | End: 2019-05-02
Attending: EMERGENCY MEDICINE | Admitting: EMERGENCY MEDICINE

## 2019-05-02 VITALS
RESPIRATION RATE: 18 BRPM | SYSTOLIC BLOOD PRESSURE: 146 MMHG | TEMPERATURE: 97.1 F | HEIGHT: 68 IN | HEART RATE: 64 BPM | BODY MASS INDEX: 29.55 KG/M2 | WEIGHT: 195 LBS | DIASTOLIC BLOOD PRESSURE: 97 MMHG | OXYGEN SATURATION: 94 %

## 2019-05-02 DIAGNOSIS — K62.5 RECTAL BLEEDING: Primary | ICD-10-CM

## 2019-05-02 LAB
ALBUMIN SERPL-MCNC: 4.3 G/DL (ref 3.5–5.2)
ALBUMIN/GLOB SERPL: 1.5 G/DL
ALP SERPL-CCNC: 72 U/L (ref 39–117)
ALT SERPL W P-5'-P-CCNC: 27 U/L (ref 1–41)
ANION GAP SERPL CALCULATED.3IONS-SCNC: 11.9 MMOL/L
AST SERPL-CCNC: 22 U/L (ref 1–40)
BASOPHILS # BLD AUTO: 0.05 10*3/MM3 (ref 0–0.2)
BASOPHILS NFR BLD AUTO: 0.7 % (ref 0–1.5)
BILIRUB SERPL-MCNC: 0.7 MG/DL (ref 0.2–1.2)
BILIRUB UR QL STRIP: NEGATIVE
BUN BLD-MCNC: 14 MG/DL (ref 8–23)
BUN/CREAT SERPL: 15.4 (ref 7–25)
CALCIUM SPEC-SCNC: 8.7 MG/DL (ref 8.6–10.5)
CHLORIDE SERPL-SCNC: 107 MMOL/L (ref 98–107)
CLARITY UR: CLEAR
CO2 SERPL-SCNC: 24.1 MMOL/L (ref 22–29)
COLOR UR: YELLOW
CREAT BLD-MCNC: 0.91 MG/DL (ref 0.76–1.27)
D-LACTATE SERPL-SCNC: 1.1 MMOL/L (ref 0.5–2)
DEPRECATED RDW RBC AUTO: 44.4 FL (ref 37–54)
EOSINOPHIL # BLD AUTO: 0.14 10*3/MM3 (ref 0–0.4)
EOSINOPHIL NFR BLD AUTO: 1.9 % (ref 0.3–6.2)
ERYTHROCYTE [DISTWIDTH] IN BLOOD BY AUTOMATED COUNT: 12.9 % (ref 12.3–15.4)
GFR SERPL CREATININE-BSD FRML MDRD: 102 ML/MIN/1.73
GLOBULIN UR ELPH-MCNC: 2.9 GM/DL
GLUCOSE BLD-MCNC: 99 MG/DL (ref 65–99)
GLUCOSE UR STRIP-MCNC: NEGATIVE MG/DL
HCT VFR BLD AUTO: 44.9 % (ref 37.5–51)
HEMOCCULT STL QL: NEGATIVE
HGB BLD-MCNC: 13.7 G/DL (ref 13–17.7)
HGB UR QL STRIP.AUTO: NEGATIVE
IMM GRANULOCYTES # BLD AUTO: 0.03 10*3/MM3 (ref 0–0.05)
IMM GRANULOCYTES NFR BLD AUTO: 0.4 % (ref 0–0.5)
INR PPP: 1.02 (ref 0.9–1.1)
KETONES UR QL STRIP: NEGATIVE
LEUKOCYTE ESTERASE UR QL STRIP.AUTO: NEGATIVE
LIPASE SERPL-CCNC: 22 U/L (ref 13–60)
LYMPHOCYTES # BLD AUTO: 1.89 10*3/MM3 (ref 0.7–3.1)
LYMPHOCYTES NFR BLD AUTO: 26.1 % (ref 19.6–45.3)
MAGNESIUM SERPL-MCNC: 2.2 MG/DL (ref 1.6–2.4)
MCH RBC QN AUTO: 28.5 PG (ref 26.6–33)
MCHC RBC AUTO-ENTMCNC: 30.5 G/DL (ref 31.5–35.7)
MCV RBC AUTO: 93.3 FL (ref 79–97)
MONOCYTES # BLD AUTO: 0.79 10*3/MM3 (ref 0.1–0.9)
MONOCYTES NFR BLD AUTO: 10.9 % (ref 5–12)
NEUTROPHILS # BLD AUTO: 4.35 10*3/MM3 (ref 1.7–7)
NEUTROPHILS NFR BLD AUTO: 60 % (ref 42.7–76)
NITRITE UR QL STRIP: NEGATIVE
NRBC BLD AUTO-RTO: 0 /100 WBC (ref 0–0.2)
PH UR STRIP.AUTO: 7 [PH] (ref 5–8)
PLATELET # BLD AUTO: 232 10*3/MM3 (ref 140–450)
PMV BLD AUTO: 9.4 FL (ref 6–12)
POTASSIUM BLD-SCNC: 4.1 MMOL/L (ref 3.5–5.2)
PROT SERPL-MCNC: 7.2 G/DL (ref 6–8.5)
PROT UR QL STRIP: NEGATIVE
PROTHROMBIN TIME: 13.1 SECONDS (ref 11.7–14.2)
RBC # BLD AUTO: 4.81 10*6/MM3 (ref 4.14–5.8)
SODIUM BLD-SCNC: 143 MMOL/L (ref 136–145)
SP GR UR STRIP: 1.01 (ref 1–1.03)
TROPONIN T SERPL-MCNC: <0.01 NG/ML (ref 0–0.03)
UROBILINOGEN UR QL STRIP: NORMAL
WBC NRBC COR # BLD: 7.25 10*3/MM3 (ref 3.4–10.8)

## 2019-05-02 PROCEDURE — 85025 COMPLETE CBC W/AUTO DIFF WBC: CPT | Performed by: EMERGENCY MEDICINE

## 2019-05-02 PROCEDURE — 80053 COMPREHEN METABOLIC PANEL: CPT | Performed by: EMERGENCY MEDICINE

## 2019-05-02 PROCEDURE — 83735 ASSAY OF MAGNESIUM: CPT | Performed by: EMERGENCY MEDICINE

## 2019-05-02 PROCEDURE — 83690 ASSAY OF LIPASE: CPT | Performed by: EMERGENCY MEDICINE

## 2019-05-02 PROCEDURE — 83605 ASSAY OF LACTIC ACID: CPT | Performed by: EMERGENCY MEDICINE

## 2019-05-02 PROCEDURE — 81003 URINALYSIS AUTO W/O SCOPE: CPT | Performed by: EMERGENCY MEDICINE

## 2019-05-02 PROCEDURE — 99283 EMERGENCY DEPT VISIT LOW MDM: CPT

## 2019-05-02 PROCEDURE — 85610 PROTHROMBIN TIME: CPT | Performed by: EMERGENCY MEDICINE

## 2019-05-02 PROCEDURE — 82272 OCCULT BLD FECES 1-3 TESTS: CPT | Performed by: EMERGENCY MEDICINE

## 2019-05-02 PROCEDURE — 84484 ASSAY OF TROPONIN QUANT: CPT | Performed by: EMERGENCY MEDICINE

## 2019-05-02 RX ORDER — ACETAMINOPHEN, ASPIRIN AND CAFFEINE 250; 250; 65 MG/1; MG/1; MG/1
1 TABLET, FILM COATED ORAL EVERY 6 HOURS PRN
COMMUNITY

## 2019-05-02 RX ORDER — RETINOL, ERGOCALCIFEROL, .ALPHA.-TOCOPHEROL ACETATE, DL-, PHYTONADIONE, ASCORBIC ACID, NIACINAMIDE, RIBOFLAVIN 5-PHOSPHATE SODIUM, THIAMINE HYDROCHLORIDE, PYRIDOXINE HYDROCHLORIDE, DEXPANTHENOL, BIOTIN, FOLIC ACID, AND CYANOCOBALAMIN 200-150/10
KIT INTRAVENOUS
COMMUNITY

## 2019-05-02 NOTE — ED NOTES
Pt to ED with c/o rectal bleeding this am, states filling the toilet last night and streaky today. Pt reports abd cramping at times, states feels like gas pains. Reports he takes an anti-diarrheal daily. denies diarrhea, constipation today. Pt slightly hypertensive, states he forgot to take BP meds last night.      Ct Baker, RN  05/02/19 0901

## 2019-05-02 NOTE — ED NOTES
Pt states unable to urinate at this time, states he just did PTA.      Ct Baker RN  05/02/19 0918

## 2019-05-02 NOTE — ED PROVIDER NOTES
EMERGENCY DEPARTMENT ENCOUNTER    CHIEF COMPLAINT  Chief Complaint: rectal bleeding  History given by: patient  History limited by: nothing  Room Number: 08/08  PMD: Fady Franklin MD      HPI:  Pt is a 64 y.o. male who presents complaining of bright red rectal bleeding that began a couple of days ago. He denies black and tarry stool but states that his stool has been loose. He also complains of mild lightheadedness and HAs for the last couple of days but denies N/V and abdominal pain. Pt states that he has had hemorrhoids in the past but he does not have pain typically associated with hemorrhoids at this time. He has no other complaints at this time.     Duration: two days  Onset: gradual  Timing: constant  Location: n/a  Radiation: n/a  Quality: bright red  Intensity/Severity: moderate  Progression: unchanged  Associated Symptoms: mild lightheadedness, HA  Aggravating Factors: none  Alleviating Factors: none  Previous Episodes: Pt has had hemorrhoids in the past  Treatment before arrival: none    PAST MEDICAL HISTORY  Active Ambulatory Problems     Diagnosis Date Noted   • Essential (primary) hypertension 10/31/2016   • Encounter for screening for malignant neoplasm of colon 04/30/2019     Resolved Ambulatory Problems     Diagnosis Date Noted   • No Resolved Ambulatory Problems     Past Medical History:   Diagnosis Date   • Allergic    • Headache    • Hypertension    • Injury of neck    • Neck pain    • Sinusitis        PAST SURGICAL HISTORY  Past Surgical History:   Procedure Laterality Date   • COLONOSCOPY  2012       FAMILY HISTORY  Family History   Problem Relation Age of Onset   • Colon cancer Mother    • Cancer Father        SOCIAL HISTORY  Social History     Socioeconomic History   • Marital status:      Spouse name: Not on file   • Number of children: Not on file   • Years of education: Not on file   • Highest education level: Not on file   Tobacco Use   • Smoking status: Former Smoker     Last  attempt to quit: 1996     Years since quittin.9   • Smokeless tobacco: Never Used   Substance and Sexual Activity   • Alcohol use: No   • Drug use: No   • Sexual activity: Defer       ALLERGIES  Penicillins    REVIEW OF SYSTEMS  Review of Systems   Constitutional: Negative for activity change, appetite change and fever.   HENT: Negative for congestion and sore throat.    Eyes: Negative.    Respiratory: Negative for cough and shortness of breath.    Cardiovascular: Negative for chest pain and leg swelling.   Gastrointestinal: Positive for anal bleeding (bright red). Negative for abdominal pain, diarrhea and vomiting.   Endocrine: Negative.    Genitourinary: Negative for decreased urine volume and dysuria.   Musculoskeletal: Negative for neck pain.   Skin: Negative for rash and wound.   Allergic/Immunologic: Negative.    Neurological: Positive for light-headedness (mild) and headaches. Negative for weakness and numbness.   Hematological: Negative.    Psychiatric/Behavioral: Negative.    All other systems reviewed and are negative.      PHYSICAL EXAM  ED Triage Vitals [19 0910]   Temp Heart Rate Resp BP SpO2   97.1 °F (36.2 °C) 69 16 -- 95 %      Temp src Heart Rate Source Patient Position BP Location FiO2 (%)   Tympanic -- -- -- --       Physical Exam   Constitutional: He is oriented to person, place, and time and well-developed, well-nourished, and in no distress. No distress.   HENT:   Mouth/Throat: Mucous membranes are normal.   Eyes: EOM are normal.   Neck: Normal range of motion.   Cardiovascular: Normal rate and regular rhythm. Exam reveals no gallop and no friction rub.   No murmur heard.  Pulmonary/Chest: Effort normal. No respiratory distress. He has no wheezes. He has no rhonchi. He has no rales.   Breath sounds are symmetric.   Abdominal: Soft. He exhibits no distension. There is no tenderness. There is no guarding.   Genitourinary: Rectal exam shows no external hemorrhoid and no fissure.    Genitourinary Comments: No melena, no gross blood   Musculoskeletal: Normal range of motion. He exhibits no deformity.   Neurological: He is alert and oriented to person, place, and time.   moving all extremities, no focal deficits   Skin: Skin is warm and dry.   Psychiatric:   Calm, cooperative       LAB RESULTS  Lab Results (last 24 hours)     Procedure Component Value Units Date/Time    CBC & Differential [[id number]] Collected:  05/02/19 0931    Specimen:  Blood Updated:  05/02/19 0948    Narrative:       The following orders were created for panel order CBC & Differential.  Procedure                               Abnormality         Status                     ---------                               -----------         ------                     CBC Auto Differential[[id number]]        Abnormal            Final result                 Please view results for these tests on the individual orders.    Comprehensive Metabolic Panel [[id number]] Collected:  05/02/19 0931    Specimen:  Blood Updated:  05/02/19 1011     Glucose 99 mg/dL      BUN 14 mg/dL      Creatinine 0.91 mg/dL      Sodium 143 mmol/L      Potassium 4.1 mmol/L      Chloride 107 mmol/L      CO2 24.1 mmol/L      Calcium 8.7 mg/dL      Total Protein 7.2 g/dL      Albumin 4.30 g/dL      ALT (SGPT) 27 U/L      AST (SGOT) 22 U/L      Alkaline Phosphatase 72 U/L      Total Bilirubin 0.7 mg/dL      eGFR  African Amer 102 mL/min/1.73      Globulin 2.9 gm/dL      A/G Ratio 1.5 g/dL      BUN/Creatinine Ratio 15.4     Anion Gap 11.9 mmol/L     Narrative:       GFR Normal >60  Chronic Kidney Disease <60  Kidney Failure <15    Lipase [[id number]]  (Normal) Collected:  05/02/19 0931    Specimen:  Blood Updated:  05/02/19 1011     Lipase 22 U/L     Protime-INR [[id number]]  (Normal) Collected:  05/02/19 0931    Specimen:  Blood Updated:  05/02/19 0956     Protime 13.1 Seconds      INR 1.02    Troponin [[id number]]  (Normal) Collected:  05/02/19 0931    Specimen:  Blood  Updated:  05/02/19 1011     Troponin T <0.010 ng/mL     Narrative:       Troponin T Reference Range:  <= 0.03 ng/mL-   Negative for AMI  >0.03 ng/mL-     Abnormal for myocardial necrosis.  Clinicians would have to utilize clinical acumen, EKG, Troponin and serial changes to determine if it is an Acute Myocardial Infarction or myocardial injury due to an underlying chronic condition.     Lactic Acid, Plasma [[id number]]  (Normal) Collected:  05/02/19 0931    Specimen:  Blood Updated:  05/02/19 1005     Lactate 1.1 mmol/L     Magnesium [[id number]]  (Normal) Collected:  05/02/19 0931    Specimen:  Blood Updated:  05/02/19 1011     Magnesium 2.2 mg/dL     CBC Auto Differential [[id number]]  (Abnormal) Collected:  05/02/19 0931    Specimen:  Blood Updated:  05/02/19 0948     WBC 7.25 10*3/mm3      RBC 4.81 10*6/mm3      Hemoglobin 13.7 g/dL      Hematocrit 44.9 %      MCV 93.3 fL      MCH 28.5 pg      MCHC 30.5 g/dL      RDW 12.9 %      RDW-SD 44.4 fl      MPV 9.4 fL      Platelets 232 10*3/mm3      Neutrophil % 60.0 %      Lymphocyte % 26.1 %      Monocyte % 10.9 %      Eosinophil % 1.9 %      Basophil % 0.7 %      Immature Grans % 0.4 %      Neutrophils, Absolute 4.35 10*3/mm3      Lymphocytes, Absolute 1.89 10*3/mm3      Monocytes, Absolute 0.79 10*3/mm3      Eosinophils, Absolute 0.14 10*3/mm3      Basophils, Absolute 0.05 10*3/mm3      Immature Grans, Absolute 0.03 10*3/mm3      nRBC 0.0 /100 WBC     Occult Blood X 1, Stool - Stool, Per Rectum [[id number]]  (Normal) Collected:  05/02/19 0933    Specimen:  Stool from Per Rectum Updated:  05/02/19 1016     Fecal Occult Blood Negative          I ordered the above labs and reviewed the results        PROCEDURES  Procedures      PROGRESS AND CONSULTS     0913  Ordered labs and UA for further evaluation.     1040  Rechecked Pt who is resting comfortably. Informed him that his labs are unremarkable and his stool was negative for blood. Discussed plans for discharge and  instructed Pt to follow up with GI if symptoms persist. I recommended that Pt increase his fiber and water intake for the next few days. Instructed him to return to the ER if he develops CP, SOA, lightheadedness, black or tarry stool, or worsening bleeding. Pt understands and agrees to all plans. All questions answered.       MEDICAL DECISION MAKING  Results were reviewed/discussed with the patient and they were also made aware of online access. Pt also made aware that some labs, such as cultures, will not be resulted during ER visit and follow up with PMD is necessary.     MDM  Number of Diagnoses or Management Options  Rectal bleeding:   Diagnosis management comments: Patient reports small amounts of bright red blood per rectum at home, but has otherwise no symptoms.  Work-up today is completely unremarkable with no anemia, renal dysfunction, likely abnormality's, and negative Hemoccult.  Patient likely with an internal hemorrhoid versus diverticulosis, and does have a scheduled colonoscopy for later this month.  I have advised him to discuss his case with his GI doctor to potentially move up his colonoscopy if needed.  I also advised him to return to the emergency department for worsening symptoms as needed.  No further intervention is needed at this time.       Amount and/or Complexity of Data Reviewed  Clinical lab tests: ordered and reviewed (Fecal occult negative, Hb=13.7)           DIAGNOSIS  Final diagnoses:   Rectal bleeding       DISPOSITION  DISCHARGE    Patient discharged in stable condition.    Reviewed implications of results, diagnosis, meds, responsibility to follow up, warning signs and symptoms of possible worsening, potential complications and reasons to return to ER, including  CP, SOA, lightheadedness, black or tarry stool, worsening bleeding or other new or worsening symptoms.    Patient/Family voiced understanding of above instructions.    Discussed plan for discharge, as there is no emergent  indication for admission. Patient referred to primary care provider for BP management due to today's BP. Pt/family is agreeable and understands need for follow up and repeat testing.  Pt is aware that discharge does not mean that nothing is wrong but it indicates no emergency is present that requires admission and they must continue care with follow-up as given below or physician of their choice.     FOLLOW-UP  Fady Franklin MD  [address] [facility] [id number] [phone]    Schedule an appointment as soon as possible for a visit       [facility] Emergency Department  [address] [facility] [address] [phone]    As needed, If symptoms worsen    Bob Bryant MD  [address] [id number] [phone]    Call            Medication List      No changes were made to your prescriptions during this visit.           Latest Documented Vital Signs:  As of 10:46 AM  BP- 146/97 HR- 64 Temp- 97.1 °F (36.2 °C) (Tympanic) O2 sat- 94%    --  Documentation assistance provided by aly Figueroa  for Dr. Romero.  Information recorded by the scribe was done at my direction and has been verified and validated by me.       Alexa Figueroa  05/02/19 [id number]       Amadeo Romero MD  05/02/19 0314

## 2019-05-02 NOTE — DISCHARGE INSTRUCTIONS
Increase water intake, follow up with your PCP and GI doctor. Return for worsening symptoms as needed.

## 2019-05-24 ENCOUNTER — TELEPHONE (OUTPATIENT)
Dept: GASTROENTEROLOGY | Facility: CLINIC | Age: 64
End: 2019-05-24

## 2019-05-30 ENCOUNTER — ANESTHESIA EVENT (OUTPATIENT)
Dept: GASTROENTEROLOGY | Facility: HOSPITAL | Age: 64
End: 2019-05-30

## 2019-05-30 ENCOUNTER — ANESTHESIA (OUTPATIENT)
Dept: GASTROENTEROLOGY | Facility: HOSPITAL | Age: 64
End: 2019-05-30

## 2019-05-30 ENCOUNTER — HOSPITAL ENCOUNTER (OUTPATIENT)
Facility: HOSPITAL | Age: 64
Setting detail: HOSPITAL OUTPATIENT SURGERY
Discharge: HOME | End: 2019-05-30
Attending: INTERNAL MEDICINE | Admitting: INTERNAL MEDICINE

## 2019-05-30 VITALS
HEIGHT: 68 IN | SYSTOLIC BLOOD PRESSURE: 119 MMHG | OXYGEN SATURATION: 95 % | DIASTOLIC BLOOD PRESSURE: 90 MMHG | BODY MASS INDEX: 30.77 KG/M2 | RESPIRATION RATE: 14 BRPM | HEART RATE: 68 BPM | TEMPERATURE: 98.6 F | WEIGHT: 203 LBS

## 2019-05-30 DIAGNOSIS — Z12.11 ENCOUNTER FOR SCREENING FOR MALIGNANT NEOPLASM OF COLON: ICD-10-CM

## 2019-05-30 PROCEDURE — 45385 COLONOSCOPY W/LESION REMOVAL: CPT | Performed by: INTERNAL MEDICINE

## 2019-05-30 PROCEDURE — 25010000002 PROPOFOL 10 MG/ML EMULSION: Performed by: NURSE ANESTHETIST, CERTIFIED REGISTERED

## 2019-05-30 PROCEDURE — 88305 TISSUE EXAM BY PATHOLOGIST: CPT | Performed by: INTERNAL MEDICINE

## 2019-05-30 RX ORDER — SODIUM CHLORIDE 0.9 % (FLUSH) 0.9 %
3 SYRINGE (ML) INJECTION EVERY 12 HOURS SCHEDULED
Status: DISCONTINUED | OUTPATIENT
Start: 2019-05-30 | End: 2019-05-30 | Stop reason: HOSPADM

## 2019-05-30 RX ORDER — PROPOFOL 10 MG/ML
VIAL (ML) INTRAVENOUS CONTINUOUS PRN
Status: DISCONTINUED | OUTPATIENT
Start: 2019-05-30 | End: 2019-05-30 | Stop reason: SURG

## 2019-05-30 RX ORDER — PROMETHAZINE HYDROCHLORIDE 25 MG/ML
12.5 INJECTION, SOLUTION INTRAMUSCULAR; INTRAVENOUS ONCE AS NEEDED
Status: DISCONTINUED | OUTPATIENT
Start: 2019-05-30 | End: 2019-05-30 | Stop reason: HOSPADM

## 2019-05-30 RX ORDER — PROMETHAZINE HYDROCHLORIDE 25 MG/1
25 SUPPOSITORY RECTAL ONCE AS NEEDED
Status: DISCONTINUED | OUTPATIENT
Start: 2019-05-30 | End: 2019-05-30 | Stop reason: HOSPADM

## 2019-05-30 RX ORDER — PROMETHAZINE HYDROCHLORIDE 25 MG/1
25 TABLET ORAL ONCE AS NEEDED
Status: DISCONTINUED | OUTPATIENT
Start: 2019-05-30 | End: 2019-05-30 | Stop reason: HOSPADM

## 2019-05-30 RX ORDER — PROPOFOL 10 MG/ML
VIAL (ML) INTRAVENOUS AS NEEDED
Status: DISCONTINUED | OUTPATIENT
Start: 2019-05-30 | End: 2019-05-30 | Stop reason: SURG

## 2019-05-30 RX ORDER — SODIUM CHLORIDE 0.9 % (FLUSH) 0.9 %
3-10 SYRINGE (ML) INJECTION AS NEEDED
Status: DISCONTINUED | OUTPATIENT
Start: 2019-05-30 | End: 2019-05-30 | Stop reason: HOSPADM

## 2019-05-30 RX ORDER — SODIUM CHLORIDE, SODIUM LACTATE, POTASSIUM CHLORIDE, CALCIUM CHLORIDE 600; 310; 30; 20 MG/100ML; MG/100ML; MG/100ML; MG/100ML
30 INJECTION, SOLUTION INTRAVENOUS CONTINUOUS
Status: DISCONTINUED | OUTPATIENT
Start: 2019-05-30 | End: 2019-05-30 | Stop reason: HOSPADM

## 2019-05-30 RX ADMIN — PROPOFOL 100 MG: 10 INJECTION, EMULSION INTRAVENOUS at 10:40

## 2019-05-30 RX ADMIN — PROPOFOL 250 MCG/KG/MIN: 10 INJECTION, EMULSION INTRAVENOUS at 10:40

## 2019-05-30 RX ADMIN — SODIUM CHLORIDE, POTASSIUM CHLORIDE, SODIUM LACTATE AND CALCIUM CHLORIDE 30 ML/HR: 600; 310; 30; 20 INJECTION, SOLUTION INTRAVENOUS at 09:37

## 2019-05-30 NOTE — ANESTHESIA PREPROCEDURE EVALUATION
Anesthesia Evaluation     Patient summary reviewed and Nursing notes reviewed                Airway   Mallampati: III  TM distance: >3 FB  Neck ROM: full  Possible difficult intubation  Dental - normal exam     Pulmonary - normal exam   Cardiovascular - normal exam    (+) hypertension less than 2 medications,       Neuro/Psych  GI/Hepatic/Renal/Endo      Musculoskeletal     Abdominal    Substance History      OB/GYN          Other                        Anesthesia Plan    ASA 2     MAC     Anesthetic plan, all risks, benefits, and alternatives have been provided, discussed and informed consent has been obtained with: patient.

## 2019-05-30 NOTE — ANESTHESIA POSTPROCEDURE EVALUATION
"Patient: Stewart Hyde    Procedure Summary     Date:  05/30/19 Room / Location:  [facility] ENDOSCOPY 1 / [facility] ENDOSCOPY    Anesthesia Start:  1038 Anesthesia Stop:  1114    Procedure:  COLONOSCOPY to cecum with cold snare polypectomies (N/A ) Diagnosis:       Encounter for screening for malignant neoplasm of colon      (Encounter for screening for malignant neoplasm of colon [Z12.11])    Surgeon:  Bob Bryant MD Provider:  Aarti Rangel MD    Anesthesia Type:  MAC ASA Status:  2          Anesthesia Type: MAC  Last vitals  BP   119/90 (05/30/19 1137)   Temp   37 °C (98.6 °F) (05/30/19 0931)   Pulse   68 (05/30/19 1137)   Resp   14 (05/30/19 1137)     SpO2   95 % (05/30/19 1137)     Post Anesthesia Care and Evaluation    Patient location during evaluation: PACU  Patient participation: complete - patient participated  Level of consciousness: awake  Pain score: 0  Pain management: adequate  Airway patency: patent  Anesthetic complications: No anesthetic complications    Cardiovascular status: acceptable  Respiratory status: acceptable  Hydration status: acceptable    Comments: Blood pressure 119/90, pulse 68, temperature 37 °C (98.6 °F), temperature source Oral, resp. rate 14, height 172.7 cm (68\"), weight 92.1 kg (203 lb), SpO2 95 %.    No anesthesia care post op    "

## 2019-05-31 LAB
CYTO UR: NORMAL
LAB AP CASE REPORT: NORMAL
PATH REPORT.FINAL DX SPEC: NORMAL
PATH REPORT.GROSS SPEC: NORMAL

## 2019-10-28 RX ORDER — IRBESARTAN 150 MG/1
150 TABLET ORAL NIGHTLY
Qty: 90 TABLET | Refills: 0 | Status: SHIPPED | OUTPATIENT
Start: 2019-10-28 | End: 2020-01-20 | Stop reason: SDUPTHER

## 2020-01-20 RX ORDER — IRBESARTAN 150 MG/1
150 TABLET ORAL NIGHTLY
Qty: 90 TABLET | Refills: 0 | Status: SHIPPED | OUTPATIENT
Start: 2020-01-20

## (undated) DEVICE — SNAR POLYP SENSATION STDOVL 27 240 BX40

## (undated) DEVICE — THE TORRENT IRRIGATION SCOPE CONNECTOR IS USED WITH THE TORRENT IRRIGATION TUBING TO PROVIDE IRRIGATION FLUIDS SUCH AS STERILE WATER DURING GASTROINTESTINAL ENDOSCOPIC PROCEDURES WHEN USED IN CONJUNCTION WITH AN IRRIGATION PUMP (OR ELECTROSURGICAL UNIT).: Brand: TORRENT

## (undated) DEVICE — CANNULA,ADULT,SOFT-TOUCH,7'TUBE,UC: Brand: PENDING

## (undated) DEVICE — Device: Brand: DEFENDO AIR/WATER/SUCTION AND BIOPSY VALVE

## (undated) DEVICE — THE SINGLE USE ETRAP – POLYP TRAP IS USED FOR SUCTION RETRIEVAL OF ENDOSCOPICALLY REMOVED POLYPS.: Brand: ETRAP

## (undated) DEVICE — TUBING, SUCTION, 1/4" X 10', STRAIGHT: Brand: MEDLINE